# Patient Record
Sex: MALE | Race: WHITE | NOT HISPANIC OR LATINO | Employment: OTHER | URBAN - NONMETROPOLITAN AREA
[De-identification: names, ages, dates, MRNs, and addresses within clinical notes are randomized per-mention and may not be internally consistent; named-entity substitution may affect disease eponyms.]

---

## 2019-01-22 ENCOUNTER — OFFICE VISIT (OUTPATIENT)
Dept: OTOLARYNGOLOGY | Facility: CLINIC | Age: 79
End: 2019-01-22

## 2019-01-22 VITALS — OXYGEN SATURATION: 98 % | HEIGHT: 67 IN | WEIGHT: 211.4 LBS | BODY MASS INDEX: 33.18 KG/M2

## 2019-01-22 DIAGNOSIS — J31.0 CHRONIC RHINITIS: Primary | ICD-10-CM

## 2019-01-22 DIAGNOSIS — J34.2 NASAL SEPTAL DEFORMITY: ICD-10-CM

## 2019-01-22 DIAGNOSIS — J34.89 NASAL VESTIBULITIS: ICD-10-CM

## 2019-01-22 PROCEDURE — 31231 NASAL ENDOSCOPY DX: CPT | Performed by: OTOLARYNGOLOGY

## 2019-01-22 PROCEDURE — 99203 OFFICE O/P NEW LOW 30 MIN: CPT | Performed by: OTOLARYNGOLOGY

## 2019-01-22 RX ORDER — CARVEDILOL 25 MG/1
25 TABLET ORAL 2 TIMES DAILY WITH MEALS
COMMUNITY

## 2019-01-22 RX ORDER — LEVOTHYROXINE SODIUM 0.05 MG/1
50 TABLET ORAL DAILY
COMMUNITY

## 2019-01-22 RX ORDER — PREDNISONE 20 MG/1
20 TABLET ORAL 2 TIMES DAILY
Qty: 10 TABLET | Refills: 0 | Status: SHIPPED | OUTPATIENT
Start: 2019-01-22 | End: 2019-01-27

## 2019-01-22 RX ORDER — PANTOPRAZOLE SODIUM 40 MG/1
40 TABLET, DELAYED RELEASE ORAL DAILY
COMMUNITY

## 2019-01-22 RX ORDER — FERROUS SULFATE 325(65) MG
325 TABLET ORAL
COMMUNITY

## 2019-01-22 RX ORDER — PROBENECID 500 MG/1
500 TABLET, FILM COATED ORAL 2 TIMES DAILY
COMMUNITY
End: 2020-05-22

## 2019-01-22 RX ORDER — COLCHICINE 0.6 MG/1
0.6 TABLET ORAL DAILY
COMMUNITY
End: 2020-05-22

## 2019-01-22 RX ORDER — MELOXICAM 7.5 MG/1
7.5 TABLET ORAL DAILY
COMMUNITY
End: 2020-05-22

## 2019-01-24 NOTE — PROGRESS NOTES
Subjective   Reginald Loyd is a 78 y.o. male.     History of Present Illness   Patient reports for 3 months he's had nasal obstruction.  He can't recall any particular inciting factors.  He has associated mucoid drainage and occasional bloody drainage.  He uses large-volume saline lavage.  He is anticoagulated on Xarelto.  Unfortunately also puts Vicks salve in his nose on a nightly basis.  Primary complaint is that his nasal obstruction is interfering with use of his CPAP.  No previous nasal surgery or injury.  So has Rhinocort and has been using multiple doses per day.      The following portions of the patient's history were reviewed and updated as appropriate: allergies, current medications, past family history, past medical history, past social history, past surgical history and problem list.      Reginald Loyd reports that he has quit smoking. he has never used smokeless tobacco.  Patient is not a tobacco user and has not been counseled for use of tobacco products    Family History   Problem Relation Age of Onset   • Diabetes Father    • Diabetes Brother    • Diabetes Paternal Grandfather        No Known Allergies      Current Outpatient Medications:   •  carvedilol (COREG) 25 MG tablet, Take 25 mg by mouth 2 (Two) Times a Day With Meals., Disp: , Rfl:   •  colchicine 0.6 MG tablet, Take 0.6 mg by mouth Daily., Disp: , Rfl:   •  Cyanocobalamin 500 MCG tablet dispersible, Take  by mouth., Disp: , Rfl:   •  ferrous sulfate 325 (65 FE) MG tablet, Take 325 mg by mouth Daily With Breakfast., Disp: , Rfl:   •  levothyroxine (SYNTHROID, LEVOTHROID) 50 MCG tablet, Take 50 mcg by mouth Daily., Disp: , Rfl:   •  magnesium oxide (MAGOX) 400 (241.3 Mg) MG tablet tablet, Take 400 mg by mouth Daily., Disp: , Rfl:   •  meloxicam (MOBIC) 7.5 MG tablet, Take 7.5 mg by mouth Daily., Disp: , Rfl:   •  Multiple Vitamin (MULTIVITAMINS PO), Take  by mouth., Disp: , Rfl:   •  pantoprazole (PROTONIX) 40 MG EC tablet, Take 40 mg by mouth  Daily., Disp: , Rfl:   •  probenecid (BENEMID) 500 MG tablet, Take 500 mg by mouth 2 (Two) Times a Day., Disp: , Rfl:   •  rivaroxaban (XARELTO) 20 MG tablet, Take 20 mg by mouth Daily., Disp: , Rfl:   •  Testosterone Cypionate 200 MG/ML kit, Inject  into the appropriate muscle as directed by prescriber., Disp: , Rfl:   •  mupirocin (BACTROBAN) 2 % ointment, Apply to nose twice a day, Disp: 30 g, Rfl: 1  •  predniSONE (DELTASONE) 20 MG tablet, Take 1 tablet by mouth 2 (Two) Times a Day for 5 days., Disp: 10 tablet, Rfl: 0    Past Medical History:   Diagnosis Date   • A-fib (CMS/HCC)    • Disease of thyroid gland    • High blood pressure    • Sleep apnea          Review of Systems   HENT: Positive for nosebleeds and postnasal drip.    Eyes: Positive for visual disturbance.   Respiratory: Positive for shortness of breath.    Musculoskeletal: Positive for arthralgias.   Hematological: Bruises/bleeds easily.   All other systems reviewed and are negative.          Objective   Physical Exam  General: Well-developed well-nourished male in no acute distress.  Alert and oriented ×3. Head: Normocephalic. Face: Symmetrical strength and appearance. PERRL. EOMI. Voice:Strong. Speech:Fluent  Ears: External ears no deformity, canals no discharge, tympanic membranes intact clear and mobile bilaterally.  Nose: Nares show no discharge mass polyp or purulence.  Markedly swollen mucosa is present.  No gross external deformity.  Septum is to the right.  There is erythema of the columella and nasal vestibule.  Mucoid secretions are present more so on the right than the left.  Oral cavity: Lips and gums without lesions.  Tongue and floor of mouth without lesions.  Parotid and submandibular ducts unobstructed.  No mucosal lesions on the buccal mucosa or vestibule of the mouth.  Pharynx: No erythema exudate mass or ulcer  Neck: No lymphadenopathy.  No thyromegaly.  Trachea and larynx midline.  No masses in the parotid or submandibular  glands.    Nasal endoscopy is performed: Santiago-Synephrine and Xylocaine are instilled the nares bilaterally.  0° scope is passed into each nostril.  The inferior, middle, and superior turbinates as well as nasal septum and nasopharynx are examined.  Pertinent findings include: Swollen mucosa throughout the nose consistent with rhinitis medicamentosa.  Septum is to the right.  The middle meatal clefts do not show any evidence of polyp or purulence.          Assessment/Plan   Reginald was seen today for sinus problem.    Diagnoses and all orders for this visit:    Chronic rhinitis    Nasal septal deformity    Nasal vestibulitis    Other orders  -     predniSONE (DELTASONE) 20 MG tablet; Take 1 tablet by mouth 2 (Two) Times a Day for 5 days.  -     mupirocin (BACTROBAN) 2 % ointment; Apply to nose twice a day      Plan: Told the patient he absolutely must discontinue the Vicks and that he will likely have at least 2 days of rather severe nasal congestion once he does so.  Instructed him to use the Rhinocort but only once a day.  He should use small volume saline lavage rather than large volume.  Prescribed mupirocin ointment to be applied to the nasal vestibule twice a day.  He states he has a fullface mask for his CPAP and I encouraged him to use that until his nasal symptoms arm medically improved.  We'll also prescribe a 5 day course of prednisone while he is withdrawn from the topical decongestants.  Return in 2 weeks, call sooner for problems.

## 2019-02-07 ENCOUNTER — OFFICE VISIT (OUTPATIENT)
Dept: OTOLARYNGOLOGY | Facility: CLINIC | Age: 79
End: 2019-02-07

## 2019-02-07 VITALS — OXYGEN SATURATION: 99 % | BODY MASS INDEX: 26.71 KG/M2 | HEIGHT: 67 IN | WEIGHT: 170.2 LBS

## 2019-02-07 DIAGNOSIS — J34.2 NASAL SEPTAL DEFORMITY: ICD-10-CM

## 2019-02-07 DIAGNOSIS — J31.0 CHRONIC RHINITIS: Primary | ICD-10-CM

## 2019-02-07 PROCEDURE — 99213 OFFICE O/P EST LOW 20 MIN: CPT | Performed by: OTOLARYNGOLOGY

## 2019-02-11 NOTE — PROGRESS NOTES
Subjective   Reginald Loyd is a 78 y.o. male.       History of Present Illness     Patient was seen previously with chronic rhinitis and rhinitis medicamentosa along with a nasal septal deformity and nasal vestibulitis.  He was advised to stop using Vicks and was given a 5-day course of prednisone.  Was also advised to use mupirocin ointment for the nasal vestibulitis.  Reports he is much improved.  Unfortunately he now cleans his nose out with rolled facial tissue which needless to say is producing some irritation.  No purulent rhinorrhea at this point.  Is able to utilize his CPAP which was his primary complaint.    The following portions of the patient's history were reviewed and updated as appropriate: allergies, current medications, past family history, past medical history, past social history, past surgical history and problem list.     reports that he has quit smoking. he has never used smokeless tobacco.   Patient is not a tobacco user and has not been counseled for use of tobacco products      Review of Systems   Constitutional: Negative for fever.           Objective   Physical Exam  General: Well-developed well-nourished male in no acute distress.  Alert and oriented x3.  Voice:Strong. Speech:Fluent  Ears: External ears no deformity, canals no discharge, tympanic membranes intact clear and mobile bilaterally.  Nose: Nares show no discharge mass polyp or purulence.  Boggy mucosa is present.  No gross external deformity.  Septum: To the right.  The previously noted vestibulitis has improved  Oral cavity: Lips and gums without lesions.  Tongue and floor of mouth without lesions.  Parotid and submandibular ducts unobstructed.  No mucosal lesions on the buccal mucosa or vestibule of the mouth.  Pharynx: No erythema exudate mass or ulcer  Neck: No lymphadenopathy.  No thyromegaly.  Trachea and larynx midline.  No masses in the parotid or submandibular glands.      Assessment/Plan   Reginald was seen today for  follow-up.    Diagnoses and all orders for this visit:    Chronic rhinitis    Nasal septal deformity    Other orders  -     budesonide (RINOCORT AQUA) 32 MCG/ACT nasal spray; 2 sprays into the nostril(s) as directed by provider Daily.        Plan: He needs a refill of his Rhinocort and this will be sent to his pharmacy.  He is to use saline nasal spray frequently and avoid manipulation of his nose.  May utilize the mupirocin as needed.  See me again in 3 months for reevaluation, call sooner for problems.

## 2020-05-22 ENCOUNTER — OFFICE VISIT (OUTPATIENT)
Dept: ENDOCRINOLOGY | Facility: CLINIC | Age: 80
End: 2020-05-22

## 2020-05-22 VITALS
DIASTOLIC BLOOD PRESSURE: 72 MMHG | SYSTOLIC BLOOD PRESSURE: 126 MMHG | BODY MASS INDEX: 32.3 KG/M2 | HEART RATE: 51 BPM | HEIGHT: 67 IN | OXYGEN SATURATION: 98 % | WEIGHT: 205.8 LBS

## 2020-05-22 DIAGNOSIS — E03.9 ACQUIRED HYPOTHYROIDISM: ICD-10-CM

## 2020-05-22 DIAGNOSIS — E16.2 HYPOGLYCEMIA: Primary | ICD-10-CM

## 2020-05-22 PROCEDURE — 99204 OFFICE O/P NEW MOD 45 MIN: CPT | Performed by: INTERNAL MEDICINE

## 2020-05-22 RX ORDER — AMIODARONE HYDROCHLORIDE 200 MG/1
200 TABLET ORAL DAILY
COMMUNITY

## 2020-05-22 RX ORDER — TAMSULOSIN HYDROCHLORIDE 0.4 MG/1
1 CAPSULE ORAL DAILY
COMMUNITY

## 2020-06-02 ENCOUNTER — HOSPITAL ENCOUNTER (OUTPATIENT)
Dept: CT IMAGING | Facility: HOSPITAL | Age: 80
Discharge: HOME | End: 2020-06-02
Admitting: INTERNAL MEDICINE

## 2020-06-02 DIAGNOSIS — E16.2 HYPOGLYCEMIA: Primary | ICD-10-CM

## 2020-06-02 DIAGNOSIS — E03.9 ACQUIRED HYPOTHYROIDISM: ICD-10-CM

## 2020-06-02 DIAGNOSIS — E16.2 HYPOGLYCEMIA: ICD-10-CM

## 2020-06-02 DIAGNOSIS — D13.7 INSULINOMA: ICD-10-CM

## 2020-06-02 LAB
ALBUMIN SERPL-MCNC: 4.4 G/DL (ref 3.5–5.2)
ALBUMIN/GLOB SERPL: 1.6 G/DL
ALP SERPL-CCNC: 71 U/L (ref 39–117)
ALT SERPL W P-5'-P-CCNC: 35 U/L (ref 1–41)
ANION GAP SERPL CALCULATED.3IONS-SCNC: 12 MMOL/L (ref 5–15)
AST SERPL-CCNC: 32 U/L (ref 1–40)
BILIRUB SERPL-MCNC: 0.7 MG/DL (ref 0.2–1.2)
BUN BLD-MCNC: 28 MG/DL (ref 8–23)
BUN/CREAT SERPL: 19 (ref 7–25)
CALCIUM SPEC-SCNC: 9.3 MG/DL (ref 8.6–10.5)
CHLORIDE SERPL-SCNC: 104 MMOL/L (ref 98–107)
CO2 SERPL-SCNC: 22 MMOL/L (ref 22–29)
CREAT BLD-MCNC: 1.47 MG/DL (ref 0.76–1.27)
GFR SERPL CREATININE-BSD FRML MDRD: 46 ML/MIN/1.73
GLOBULIN UR ELPH-MCNC: 2.7 GM/DL
GLUCOSE BLD-MCNC: 91 MG/DL (ref 65–99)
POTASSIUM BLD-SCNC: 4.6 MMOL/L (ref 3.5–5.2)
PROT SERPL-MCNC: 7.1 G/DL (ref 6–8.5)
SODIUM BLD-SCNC: 138 MMOL/L (ref 136–145)

## 2020-06-02 PROCEDURE — 74160 CT ABDOMEN W/CONTRAST: CPT

## 2020-06-02 PROCEDURE — 0 DIATRIZOATE MEGLUMINE & SODIUM PER 1 ML: Performed by: INTERNAL MEDICINE

## 2020-06-02 PROCEDURE — 36415 COLL VENOUS BLD VENIPUNCTURE: CPT | Performed by: INTERNAL MEDICINE

## 2020-06-02 PROCEDURE — 25010000002 IOPAMIDOL 61 % SOLUTION: Performed by: INTERNAL MEDICINE

## 2020-06-02 PROCEDURE — 80053 COMPREHEN METABOLIC PANEL: CPT | Performed by: INTERNAL MEDICINE

## 2020-06-02 RX ADMIN — DIATRIZOATE MEGLUMINE AND DIATRIZOATE SODIUM 15 ML: 660; 100 LIQUID ORAL; RECTAL at 16:58

## 2020-06-02 RX ADMIN — IOPAMIDOL 80 ML: 612 INJECTION, SOLUTION INTRAVENOUS at 16:57

## 2020-06-04 ENCOUNTER — OFFICE VISIT (OUTPATIENT)
Dept: ENDOCRINOLOGY | Facility: CLINIC | Age: 80
End: 2020-06-04

## 2020-06-04 VITALS
SYSTOLIC BLOOD PRESSURE: 130 MMHG | HEART RATE: 50 BPM | BODY MASS INDEX: 31.33 KG/M2 | DIASTOLIC BLOOD PRESSURE: 64 MMHG | WEIGHT: 199.6 LBS | HEIGHT: 67 IN

## 2020-06-04 DIAGNOSIS — E03.9 ACQUIRED HYPOTHYROIDISM: ICD-10-CM

## 2020-06-04 DIAGNOSIS — E16.2 HYPOGLYCEMIA: Primary | ICD-10-CM

## 2020-06-04 PROBLEM — G47.33 OBSTRUCTIVE SLEEP APNEA SYNDROME: Status: ACTIVE | Noted: 2020-06-04

## 2020-06-04 PROBLEM — M10.9 GOUT: Status: ACTIVE | Noted: 2017-03-07

## 2020-06-04 PROBLEM — I48.91 ATRIAL FIBRILLATION (HCC): Status: ACTIVE | Noted: 2019-08-14

## 2020-06-04 PROBLEM — E85.4 CEREBRAL AMYLOID ANGIOPATHY (HCC): Status: ACTIVE | Noted: 2020-01-15

## 2020-06-04 PROBLEM — E56.1 VITAMIN K DEFICIENCY: Status: ACTIVE | Noted: 2020-06-04

## 2020-06-04 PROBLEM — I68.0 CEREBRAL AMYLOID ANGIOPATHY (HCC): Status: ACTIVE | Noted: 2020-01-15

## 2020-06-04 PROBLEM — N18.30 CHRONIC RENAL INSUFFICIENCY, STAGE III (MODERATE) (HCC): Status: ACTIVE | Noted: 2020-06-04

## 2020-06-04 PROBLEM — I10 ESSENTIAL HYPERTENSION: Status: ACTIVE | Noted: 2019-11-13

## 2020-06-04 PROCEDURE — 99214 OFFICE O/P EST MOD 30 MIN: CPT | Performed by: INTERNAL MEDICINE

## 2020-06-04 PROCEDURE — 95251 CONT GLUC MNTR ANALYSIS I&R: CPT | Performed by: INTERNAL MEDICINE

## 2020-06-04 NOTE — PROGRESS NOTES
Reginald Loyd is a 79 y.o. male who presents for  evaluation of   Chief Complaint   Patient presents with   • Follow-up     2 week        Referring provider    No referring provider defined for this encounter.    Primary Care Provider    [facility] Mcgill MD          Past Medical History:   Diagnosis Date   • A-fib (CMS/HCC)    • Disease of thyroid gland    • High blood pressure    • Sleep apnea      Family History   Problem Relation Age of Onset   • Diabetes Father    • Diabetes Brother    • Diabetes Paternal Grandfather      Social History     Tobacco Use   • Smoking status: Former Smoker   • Smokeless tobacco: Never Used   Substance Use Topics   • Alcohol use: Not on file   • Drug use: Not on file         Current Outpatient Medications:   •  amiodarone (PACERONE) 200 MG tablet, Take 200 mg by mouth Daily., Disp: , Rfl:   •  budesonide (RINOCORT AQUA) 32 MCG/ACT nasal spray, 2 sprays into the nostril(s) as directed by provider Daily., Disp: 1 bottle, Rfl: 11  •  carvedilol (COREG) 25 MG tablet, Take 25 mg by mouth 2 (Two) Times a Day With Meals., Disp: , Rfl:   •  Cyanocobalamin 500 MCG tablet dispersible, Take  by mouth., Disp: , Rfl:   •  ferrous sulfate 325 (65 FE) MG tablet, Take 325 mg by mouth Daily With Breakfast., Disp: , Rfl:   •  levothyroxine (SYNTHROID, LEVOTHROID) 50 MCG tablet, Take 50 mcg by mouth Daily., Disp: , Rfl:   •  magnesium oxide (MAGOX) 400 (241.3 Mg) MG tablet tablet, Take 400 mg by mouth Daily., Disp: , Rfl:   •  Multiple Vitamin (MULTIVITAMINS PO), Take  by mouth., Disp: , Rfl:   •  pantoprazole (PROTONIX) 40 MG EC tablet, Take 40 mg by mouth Daily., Disp: , Rfl:   •  rivaroxaban (XARELTO) 20 MG tablet, Take 20 mg by mouth Daily., Disp: , Rfl:   •  tamsulosin (FLOMAX) 0.4 MG capsule 24 hr capsule, Take 1 capsule by mouth Daily., Disp: , Rfl:     Review of Systems    Review of Systems     Objective:   /64 (BP Location: Right arm, Patient Position: Sitting, Cuff Size: Large  "Adult)   Pulse 50   Ht 170.2 cm (67\")   Wt 90.5 kg (199 lb 9.6 oz)   BMI 31.26 kg/m²     Physical Exam    Lab Review    Results for orders placed or performed in visit on 06/02/20   Comprehensive Metabolic Panel   Result Value Ref Range    Glucose 91 65 - 99 mg/dL    BUN 28 (H) 8 - 23 mg/dL    Creatinine 1.47 (H) 0.76 - 1.27 mg/dL    Sodium 138 136 - 145 mmol/L    Potassium 4.6 3.5 - 5.2 mmol/L    Chloride 104 98 - 107 mmol/L    CO2 22.0 22.0 - 29.0 mmol/L    Calcium 9.3 8.6 - 10.5 mg/dL    Total Protein 7.1 6.0 - 8.5 g/dL    Albumin 4.40 3.50 - 5.20 g/dL    ALT (SGPT) 35 1 - 41 U/L    AST (SGOT) 32 1 - 40 U/L    Alkaline Phosphatase 71 39 - 117 U/L    Total Bilirubin 0.7 0.2 - 1.2 mg/dL    eGFR Non African Amer 46 (L) >60 mL/min/1.73    Globulin 2.7 gm/dL    A/G Ratio 1.6 g/dL    BUN/Creatinine Ratio 19.0 7.0 - 25.0    Anion Gap 12.0 5.0 - 15.0 mmol/L         Assessment/Plan       ICD-10-CM ICD-9-CM   1. 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 Acquired hypothyroidism E03.9 244.9         I reviewed and summarized records from [facility] Mcgill MD from current year  and I reviewed / ordered labs.   From review of records :        No orders of the defined types were placed in this encounter.        A copy of my note was sent to [facility] Mcgill MD    Please see my above opinion and suggestions.             This document has been electronically signed by Isaac Medellin MD on June 4, 2020 11:17    "

## 2020-06-08 NOTE — PROGRESS NOTES
Reginald Loyd is a 79 y.o. male who presents for  evaluation of   Chief Complaint   Patient presents with   • Follow-up     2 week        Referring provider     Primary Care Provider    [facility] Mcgill MD    79-year-old male comes for fu of hypoglycemia     Duration documented in 2020.    Severity high patient experienced syncope requiring EMS and hospital admission.    Quantity, 2 episodes have occurred.    Timing, both episodes occurred after eating supper (2 to 3 hours after supper)    Modifying factors, patient had a gastric bypass surgery (Cintia-en-Y) approximately 20 years ago, drinking 5 beers at night     Past Medical History:   Diagnosis Date   • A-fib (CMS/[facility])    • Disease of thyroid gland    • High blood pressure    • Sleep apnea      Family History   Problem Relation Age of Onset   • Diabetes Father    • Diabetes Brother    • Diabetes Paternal Grandfather      Social History     Tobacco Use   • Smoking status: Former Smoker   • Smokeless tobacco: Never Used   Substance Use Topics   • Alcohol use: Not on file   • Drug use: Not on file         Current Outpatient Medications:   •  amiodarone (PACERONE) 200 MG tablet, Take 200 mg by mouth Daily., Disp: , Rfl:   •  budesonide (RINOCORT AQUA) 32 MCG/ACT nasal spray, 2 sprays into the nostril(s) as directed by provider Daily., Disp: 1 bottle, Rfl: 11  •  carvedilol (COREG) 25 MG tablet, Take 25 mg by mouth 2 (Two) Times a Day With Meals., Disp: , Rfl:   •  Cyanocobalamin 500 MCG tablet dispersible, Take  by mouth., Disp: , Rfl:   •  ferrous sulfate 325 (65 FE) MG tablet, Take 325 mg by mouth Daily With Breakfast., Disp: , Rfl:   •  levothyroxine (SYNTHROID, LEVOTHROID) 50 MCG tablet, Take 50 mcg by mouth Daily., Disp: , Rfl:   •  magnesium oxide (MAGOX) 400 (241.3 Mg) MG tablet tablet, Take 400 mg by mouth Daily., Disp: , Rfl:   •  Multiple Vitamin (MULTIVITAMINS PO), Take  by mouth., Disp: , Rfl:   •  pantoprazole (PROTONIX) 40 MG EC tablet, Take 40  mg by mouth Daily., Disp: , Rfl:   •  rivaroxaban (XARELTO) 20 MG tablet, Take 20 mg by mouth Daily., Disp: , Rfl:   •  tamsulosin (FLOMAX) 0.4 MG capsule 24 hr capsule, Take 1 capsule by mouth Daily., Disp: , Rfl:     Review of Systems    Review of Systems   Constitutional: Positive for fatigue. Negative for activity change, appetite change, chills, diaphoresis, fever and unexpected weight change.   HENT: Negative for congestion, dental problem, drooling, ear discharge, ear pain, facial swelling, mouth sores, postnasal drip, rhinorrhea, sinus pressure, sore throat, tinnitus, trouble swallowing and voice change.    Eyes: Negative for photophobia, pain, discharge, redness, itching and visual disturbance.   Respiratory: Negative for apnea, cough, choking, chest tightness, shortness of breath, wheezing and stridor.    Cardiovascular: Negative for chest pain, palpitations and leg swelling.   Gastrointestinal: Negative for abdominal distention, abdominal pain, constipation, diarrhea, nausea and vomiting.   Endocrine: Negative for cold intolerance, heat intolerance, polydipsia, polyphagia and polyuria.   Genitourinary: Negative for decreased urine volume, difficulty urinating, dysuria, flank pain, frequency, hematuria and urgency.   Musculoskeletal: Positive for myalgias. Negative for arthralgias, back pain, gait problem, joint swelling, neck pain and neck stiffness.   Skin: Negative for color change, pallor, rash and wound.   Allergic/Immunologic: Negative for immunocompromised state.   Neurological: Positive for weakness. Negative for dizziness, tremors, seizures, syncope, facial asymmetry, speech difficulty, light-headedness, numbness and headaches.   Hematological: Negative for adenopathy.   Psychiatric/Behavioral: Positive for decreased concentration. Negative for agitation, behavioral problems, confusion, dysphoric mood, hallucinations, self-injury, sleep disturbance and suicidal ideas. The patient is not  "nervous/anxious and is not hyperactive.         Objective:   /64 (BP Location: Right arm, Patient Position: Sitting, Cuff Size: Large Adult)   Pulse 50   Ht 170.2 cm (67\")   Wt 90.5 kg (199 lb 9.6 oz)   BMI 31.26 kg/m²     Physical Exam   Constitutional: He is oriented to person, place, and time. He appears well-developed and well-nourished. He is cooperative.   HENT:   Head: Normocephalic and atraumatic.   Right Ear: External ear normal.   Left Ear: External ear normal.   Nose: Nose normal.   Mouth/Throat: Oropharynx is clear and moist. No oropharyngeal exudate.   Eyes: Pupils are equal, round, and reactive to light. Conjunctivae and EOM are normal. No scleral icterus. Right eye exhibits normal extraocular motion. Left eye exhibits normal extraocular motion.   Neck: Neck supple. No JVD present. No muscular tenderness present. No tracheal deviation, no edema and no erythema present. No thyromegaly present.   Cardiovascular: Normal rate, regular rhythm, normal heart sounds and intact distal pulses. Exam reveals no gallop and no friction rub.   No murmur heard.  Pulmonary/Chest: Effort normal and breath sounds normal. No stridor. No respiratory distress. He has no decreased breath sounds. He has no wheezes. He has no rhonchi. He has no rales. He exhibits no tenderness.   Abdominal: Soft. Bowel sounds are normal. He exhibits no distension and no mass. There is no hepatomegaly. There is no tenderness. There is no rebound and no guarding. No hernia.   Musculoskeletal: Normal range of motion. He exhibits no edema, tenderness or deformity.   Lymphadenopathy:     He has no cervical adenopathy.   Neurological: He is alert and oriented to person, place, and time. He has normal reflexes. No cranial nerve deficit. He exhibits normal muscle tone. Coordination normal.   Skin: Skin is warm. No rash noted. No erythema. No pallor.   Psychiatric: He has a normal mood and affect. His behavior is normal. Judgment and thought " "content normal.   Nursing note and vitals reviewed.      Lab Review    Results for orders placed or performed in visit on 06/02/20   Comprehensive Metabolic Panel   Result Value Ref Range    Glucose 91 65 - 99 mg/dL    BUN 28 (H) 8 - 23 mg/dL    Creatinine 1.47 (H) 0.76 - 1.27 mg/dL    Sodium 138 136 - 145 mmol/L    Potassium 4.6 3.5 - 5.2 mmol/L    Chloride 104 98 - 107 mmol/L    CO2 22.0 22.0 - 29.0 mmol/L    Calcium 9.3 8.6 - 10.5 mg/dL    Total Protein 7.1 6.0 - 8.5 g/dL    Albumin 4.40 3.50 - 5.20 g/dL    ALT (SGPT) 35 1 - 41 U/L    AST (SGOT) 32 1 - 40 U/L    Alkaline Phosphatase 71 39 - 117 U/L    Total Bilirubin 0.7 0.2 - 1.2 mg/dL    eGFR Non African Amer 46 (L) >60 mL/min/1.73    Globulin 2.7 gm/dL    A/G Ratio 1.6 g/dL    BUN/Creatinine Ratio 19.0 7.0 - 25.0    Anion Gap 12.0 5.0 - 15.0 mmol/L         Assessment/Plan       ICD-10-CM ICD-9-CM   1. Hypoglycemia E16.2 251.2   2. Acquired hypothyroidism E03.9 244.9         I reviewed and summarized records from [facility] Mcgill MD from current year  and I reviewed / ordered labs.   From review of records :    Patient has had 2 episodes of hypoglycemia that have occurred late in the evening after eating supper that seems to contain moderate amount of carbs.    Last appt I placed a tatyana monitor  Data for the past 2 weeks reviewed,   In range 94% of the time   He has hypoglycemia between 7 to 9 pm     He has history of Cintia-en-Y gastric bypass.    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  In this entity there is abrupt \"dumping\" of carbs and this hyper stimulates the L cells to release insulin.    I suggested a dietary modifications, proteins and fats should precede carbs and carbs should have low glycemic index particularly at night when the phenomenon seems to occur.    He asks why he sees these now and not earlier in life and I explained that aging aggravates hypoglycemia.    He also " recognizes that he drinks 4-5 beers at night and I explained that this inhibits glycogenolysis in response to hypoglycemia.    In summary it seems to be 3 hits (Cintia-en-Y gastric bypass being the most important factor, aging, beer at night) He is also very active all day     I reviewed CT scan of the abdomen and he has no pancreatic mass    Laboratory assessment as below to rule out other possible conditions.    I also inserted a CGM to evalute , RTC in 2 weeks     Obtain TSH - hasn't been done     No orders of the defined types were placed in this encounter.        A copy of my note was sent to [facility] Mcgill MD    Please see my above opinion and suggestions.             This document has been electronically signed by Isaac Medellin MD on June 7, 2020 19:24